# Patient Record
Sex: MALE | Race: WHITE | NOT HISPANIC OR LATINO | Employment: FULL TIME | URBAN - METROPOLITAN AREA
[De-identification: names, ages, dates, MRNs, and addresses within clinical notes are randomized per-mention and may not be internally consistent; named-entity substitution may affect disease eponyms.]

---

## 2018-03-07 ENCOUNTER — APPOINTMENT (OUTPATIENT)
Dept: GENERAL RADIOLOGY | Facility: CLINIC | Age: 43
End: 2018-03-07
Attending: EMERGENCY MEDICINE
Payer: COMMERCIAL

## 2018-03-07 ENCOUNTER — HOSPITAL ENCOUNTER (EMERGENCY)
Facility: CLINIC | Age: 43
Discharge: HOME | End: 2018-03-07
Attending: EMERGENCY MEDICINE | Admitting: EMERGENCY MEDICINE
Payer: COMMERCIAL

## 2018-03-07 VITALS
TEMPERATURE: 98.5 F | SYSTOLIC BLOOD PRESSURE: 107 MMHG | HEIGHT: 71 IN | HEART RATE: 72 BPM | DIASTOLIC BLOOD PRESSURE: 59 MMHG | OXYGEN SATURATION: 98 % | RESPIRATION RATE: 20 BRPM

## 2018-03-07 DIAGNOSIS — S83.92XA SPRAIN OF LEFT KNEE, UNSPECIFIED LIGAMENT, INITIAL ENCOUNTER: ICD-10-CM

## 2018-03-07 PROCEDURE — 29505 APPLICATION LONG LEG SPLINT: CPT | Mod: LT

## 2018-03-07 PROCEDURE — 99284 EMERGENCY DEPT VISIT MOD MDM: CPT | Mod: 25

## 2018-03-07 PROCEDURE — 73560 X-RAY EXAM OF KNEE 1 OR 2: CPT | Mod: LT

## 2018-03-07 PROCEDURE — 25000132 ZZH RX MED GY IP 250 OP 250 PS 637: Performed by: EMERGENCY MEDICINE

## 2018-03-07 RX ORDER — TRAMADOL HYDROCHLORIDE 50 MG/1
50-100 TABLET ORAL EVERY 6 HOURS PRN
Qty: 14 TABLET | Refills: 0 | Status: SHIPPED | OUTPATIENT
Start: 2018-03-07

## 2018-03-07 RX ORDER — OXYCODONE AND ACETAMINOPHEN 5; 325 MG/1; MG/1
2 TABLET ORAL ONCE
Status: COMPLETED | OUTPATIENT
Start: 2018-03-07 | End: 2018-03-07

## 2018-03-07 RX ADMIN — OXYCODONE HYDROCHLORIDE AND ACETAMINOPHEN 2 TABLET: 5; 325 TABLET ORAL at 04:29

## 2018-03-07 ASSESSMENT — ENCOUNTER SYMPTOMS
JOINT SWELLING: 1
NECK PAIN: 0
BACK PAIN: 0
ARTHRALGIAS: 1

## 2018-03-07 NOTE — ED AVS SNAPSHOT
Emergency Department    [address] [facility] [address]    Phone:  [phone]    Fax:  [phone]                                       Jung Lawler   MRN: [id number]    Department:   Emergency Department   Date of Visit:  3/7/2018           Patient Information     Date Of Birth          1975        Your diagnoses for this visit were:     Sprain of left knee, unspecified ligament, initial encounter        You were seen by Neal Araya MD.      Follow-up Information     Follow up with  Emergency Department.    Specialty:  EMERGENCY MEDICINE    Why:  As needed    Contact information:    [address] [facility] [address] [phone]        Follow up with Orthopaedics, [facility] In 1 week.    Why:  As needed    Contact information:    [address] [id number] [phone]          Discharge Instructions       Take the below medications as prescribed.     New Prescriptions    TRAMADOL (ULTRAM) 50 MG TABLET    Take 1-2 tablets ( mg) by mouth every 6 hours as needed for pain Maximum 10 tablet(s) per day       1. -Take acetaminophen 500 to 1000 mg by mouth every 4 to 6 hours as needed for pain or fever.  Do not take more than 4000 mg in 24 hours.  Do not take within 6 hours of another acetaminophen containing medication such as norco (vicodin) or percocet.  - Take ibuprofen 600 to 800 mg by mouth every 6 to 8 hours as needed for pain or fever  2. Use ice as needed for swelling.  3. Elevated extremity to help with swelling.  4. Follow-up with your primary doctor or [facility] as needed.  5. You may return to the ED as needed for new or worsening symptoms such as reinjury, severe and uncontrollable pain, focal weakness, severe numbness and tingling, any other concerning symptoms.      24 Hour Appointment Hotline       To make an appointment at any [facility], call [phone] ([phone]). If you don't have a  family doctor or clinic, we will help you find one. [facility] are conveniently located to serve the needs of you and your family.             Review of your medicines      START taking        Dose / Directions Last dose taken    traMADol 50 MG tablet   Commonly known as:  ULTRAM   Dose:   mg   Quantity:  14 tablet        Take 1-2 tablets ( mg) by mouth every 6 hours as needed for pain Maximum 10 tablet(s) per day   Refills:  0                Prescriptions were sent or printed at these locations (1 Prescription)                   Other Prescriptions                Printed at Department/Unit printer (1 of 1)         traMADol (ULTRAM) 50 MG tablet                Procedures and tests performed during your visit     XR Knee Left 1/2 Views      Orders Needing Specimen Collection     None      Pending Results     Date and Time Order Name Status Description    3/7/2018 0414 XR Knee Left 1/2 Views Preliminary             Pending Culture Results     No orders found from 3/5/2018 to 3/8/2018.            Pending Results Instructions     If you had any lab results that were not finalized at the time of your Discharge, you can call the ED Lab Result RN at [phone]. You will be contacted by this team for any positive Lab results or changes in treatment. The nurses are available 7 days a week from 10A to 6:30P.  You can leave a message 24 hours per day and they will return your call.        Test Results From Your Hospital Stay              3/7/2018  5:24 AM      Narrative     LEFT KNEE 2 VIEWS  3/7/2018 4:57 AM     HISTORY: Fall. Pain.    COMPARISON: None.        Impression     IMPRESSION:  1. No visualized acute fracture or malalignment of the left knee.  2. No left knee joint effusion.                Clinical Quality Measure: Blood Pressure Screening     Your blood pressure was checked while you were in the emergency department today. The last reading we obtained was  BP: 107/59 . Please read the guidelines  "below about what these numbers mean and what you should do about them.  If your systolic blood pressure (the top number) is less than 120 and your diastolic blood pressure (the bottom number) is less than 80, then your blood pressure is normal. There is nothing more that you need to do about it.  If your systolic blood pressure (the top number) is 120-139 or your diastolic blood pressure (the bottom number) is 80-89, your blood pressure may be higher than it should be. You should have your blood pressure rechecked within a year by a primary care provider.  If your systolic blood pressure (the top number) is 140 or greater or your diastolic blood pressure (the bottom number) is 90 or greater, you may have high blood pressure. High blood pressure is treatable, but if left untreated over time it can put you at risk for heart attack, stroke, or kidney failure. You should have your blood pressure rechecked by a primary care provider within the next 4 weeks.  If your provider in the emergency department today gave you specific instructions to follow-up with your doctor or provider even sooner than that, you should follow that instruction and not wait for up to 4 weeks for your follow-up visit.        Thank you for choosing [facility]       Thank you for choosing [facility] for your care. Our goal is always to provide you with excellent care. Hearing back from our patients is one way we can continue to improve our services. Please take a few minutes to complete the written survey that you may receive in the mail after you visit with us. Thank you!        [facility] Information     ibeatyou lets you send messages to your doctor, view your test results, renew your prescriptions, schedule appointments and more. To sign up, go to [email or web address] . Click on \"Log in\" on the left side of the screen, which will take you to the Welcome page. Then click on \"Sign up Now\" on the right side of the page.     You will be asked to enter " the access code listed below, as well as some personal information. Please follow the directions to create your username and password.     Your access code is: [email or web address]  Expires: 2018  5:53 AM     Your access code will  in 90 days. If you need help or a new code, please call your [facility] clinic or [phone].        Care EveryWhere ID     This is your Care EveryWhere ID. This could be used by other organizations to access your [facility] medical records  [id number]        Equal Access to Services     : Marty Arevalo, ricco gale, daria westbrookalvictor hugo riojas, viki henderson . [facility] [phone].    ATENCIÓN: Si habla español, tiene a goins disposición servicios gratuitos de asistencia lingüística. Llame al [phone].    We comply with applicable federal civil rights laws and [place] laws. We do not discriminate on the basis of race, color, national origin, age, disability, sex, sexual orientation, or gender identity.            After Visit Summary       This is your record. Keep this with you and show to your community pharmacist(s) and doctor(s) at your next visit.

## 2018-03-07 NOTE — ED AVS SNAPSHOT
Emergency Department    [address] [place] [address]    Phone:  [phone]    Fax:  [phone]                                       Jung Lawler   MRN: [id number]    Department:   Emergency Department   Date of Visit:  3/7/2018           After Visit Summary Signature Page     I have received my discharge instructions, and my questions have been answered. I have discussed any challenges I see with this plan with the nurse or doctor.    ..........................................................................................................................................  Patient/Patient Representative Signature      ..........................................................................................................................................  Patient Representative Print Name and Relationship to Patient    ..................................................               ................................................  Date                                            Time    ..........................................................................................................................................  Reviewed by Signature/Title    ...................................................              ..............................................  Date                                                            Time

## 2018-03-07 NOTE — DISCHARGE INSTRUCTIONS
Take the below medications as prescribed.     New Prescriptions    TRAMADOL (ULTRAM) 50 MG TABLET    Take 1-2 tablets ( mg) by mouth every 6 hours as needed for pain Maximum 10 tablet(s) per day       1. -Take acetaminophen 500 to 1000 mg by mouth every 4 to 6 hours as needed for pain or fever.  Do not take more than 4000 mg in 24 hours.  Do not take within 6 hours of another acetaminophen containing medication such as norco (vicodin) or percocet.  - Take ibuprofen 600 to 800 mg by mouth every 6 to 8 hours as needed for pain or fever  2. Use ice as needed for swelling.  3. Elevated extremity to help with swelling.  4. Follow-up with your primary doctor or [facility] as needed.  5. You may return to the ED as needed for new or worsening symptoms such as reinjury, severe and uncontrollable pain, focal weakness, severe numbness and tingling, any other concerning symptoms.

## 2018-03-07 NOTE — ED PROVIDER NOTES
"  History     Chief Complaint:  Left Knee Pain    HPI   Jung Lawler is a 42 year old male who presents to the emergency department for evaluation of traumatic left knee pain. The patient states that he was carrying laundry going down the stairs last night at approximately 2200 when he slipped, landing on his left knee. Following this, the patient states that he has had some left knee pain and swelling, which has been progressively worsening, especially with movement. He was initially able to ambulate, though he is not able to ambulate now secondary to pain, prompting his ED visit. He has not taken any medications for his symptoms. He denies any extremity numbness or tingling and denies sustaining any other injuries as a result of his fall, including injury to his head, neck, back, or other extremities.     Allergies:  NKDA     Medications:    The patient is currently on no regular medications.      Past Medical History:    Osgood-Schlatter's disease    Past Surgical History:    Orthopedic right femur procedure  Hemorrhoidectomy     Family / Social History:    No past pertinent family history.     Social History:  Presents alone.   Current Every day smoker, 0.25 ppd.   Positive for alcohol use.   Marital Status:  Single [1]    Review of Systems   Musculoskeletal: Positive for arthralgias (Left Knee), gait problem and joint swelling. Negative for back pain and neck pain.   All other systems reviewed and are negative.    Physical Exam   First Vitals:  BP: 107/59  Pulse: 72  Temp: 98.5  F (36.9  C)  Resp: 20  Height: 180.3 cm (5' 11\")  SpO2: 98 %    Physical Exam   Constitutional: He appears well-developed and well-nourished.   HENT:   Head: Atraumatic.   Eyes: Conjunctivae are normal.   Cardiovascular: Normal rate, regular rhythm and intact distal pulses.    2+ bilat rad and DP pulses   Pulmonary/Chest: Effort normal and breath sounds normal. No stridor.   Musculoskeletal:        Left knee: He exhibits normal range " of motion, no swelling, no effusion, no ecchymosis, no deformity, no laceration, no erythema, no LCL laxity and no MCL laxity. Tenderness found.        Left ankle: Normal.        Left foot: Normal.   No T or L spine tenderness    nml L straight leg test, no patellar tendon defect   Nursing note and vitals reviewed.    Emergency Department Course   Imaging:  Radiographic findings were communicated with the patient who voiced understanding of the findings.    XR Knee, left 1-2 views:   1. No visualized acute fracture or malalignment of the left knee.  2. No left knee joint effusion. As per radiology.     Interventions:  0429 Percocet 5-325 mg 2 tablets PO    Emergency Department Course:  Nursing notes and vitals reviewed. 0408 I performed an exam of the patient as documented above.     IV inserted. Medicine administered as documented above. Blood drawn. This was sent to the lab for further testing, results above.    The patient was sent for a Knee XR while in the emergency department, findings above.     0536 I rechecked the patient and discussed the results of his workup thus far.     The patient was ambulated here in the emergency department with a knee immobilizer without difficulty.     Findings and plan explained to the Patient. Patient discharged home with instructions regarding supportive care, medications, and reasons to return. The importance of close follow-up was reviewed. The patient was prescribed Tramadol.     Impression & Plan    Medical Decision Making:  Jung Lawler is a 42 year old male  who presents for evaluation of his left knee.  His exam findings are noted above, most pertinent is no redness, warmth to knee making septic arthritis and/or crystal disease of joint very unlikely.  Signs and symptoms are consistent with a knee sprain.  A broad differential was also considered including sprain, strain, fracture, tendon rupture, nerve impingement/compromise, referred pain. XR is negative for acute  fracture or dislocation.  Hx and exam also inconsistent w/ dislocation and reduction. Supportive outpatient management is indicated.  Rest, ice, and elevation treatment was discussed with the patient. The patients head to toe trauma exam is otherwise negative for serious underlying disease of the head, neck, chest, abdomen, extremities, pelvis.  Close follow-up with patient's primary care physician or [facility] per discharge precautions.       Diagnosis:    ICD-10-CM   1. Sprain of left knee, unspecified ligament, initial encounter S83.92XA       Disposition:  discharged to home    Discharge Medications:  New Prescriptions    TRAMADOL (ULTRAM) 50 MG TABLET    Take 1-2 tablets ( mg) by mouth every 6 hours as needed for pain Maximum 10 tablet(s) per day     Kayla WANG, am serving as a scribe on 3/7/2018 at 4:08 AM to personally document services performed by Nael Araya MD based on my observations and the provider's statements to me.       Kayla Harrell  3/7/2018    EMERGENCY DEPARTMENT       Nael Araya MD  03/07/18 2007

## 2023-08-24 ENCOUNTER — HOSPITAL ENCOUNTER (EMERGENCY)
Facility: CLINIC | Age: 48
Discharge: HOME | End: 2023-08-24
Attending: PHYSICIAN ASSISTANT | Admitting: PHYSICIAN ASSISTANT
Payer: COMMERCIAL

## 2023-08-24 VITALS
RESPIRATION RATE: 20 BRPM | SYSTOLIC BLOOD PRESSURE: 134 MMHG | BODY MASS INDEX: 19.6 KG/M2 | OXYGEN SATURATION: 99 % | WEIGHT: 140 LBS | HEIGHT: 71 IN | HEART RATE: 79 BPM | DIASTOLIC BLOOD PRESSURE: 80 MMHG | TEMPERATURE: 97.8 F

## 2023-08-24 DIAGNOSIS — K64.5 THROMBOSED EXTERNAL HEMORRHOIDS: ICD-10-CM

## 2023-08-24 PROCEDURE — 99284 EMERGENCY DEPT VISIT MOD MDM: CPT

## 2023-08-24 RX ORDER — OXYCODONE HYDROCHLORIDE 5 MG/1
5 TABLET ORAL EVERY 6 HOURS PRN
Qty: 6 TABLET | Refills: 0 | Status: SHIPPED | OUTPATIENT
Start: 2023-08-24 | End: 2023-08-27

## 2023-08-24 NOTE — ED PROVIDER NOTES
"  History     Chief Complaint:  Rectal/perineal Pain       HPI   Jung Lawler is a 48 year old male with a history of hemorrhoids, status post hemorrhoidectomy, who presents to the ED for evaluation of rectal pain.  Patient states he has a history of hemorrhoids and notes that 2 days ago he developed rectal pain and is able to palpate an external hemorrhoid that he is concerned is thrombosed.  He notes that he has tried suppository preparation H as well as A&D without improvement of symptoms. One episode of small amount of blood coating one of his stools over last 2 days.    Independent Historian:   None - Patient Only    Review of External Notes:   None    Medications:    diltiazem 2% in lipovan cream 2% GEL  oxyCODONE (ROXICODONE) 5 MG tablet  traMADol (ULTRAM) 50 MG tablet        Past Medical History:    Past Medical History:   Diagnosis Date    Osgood-Schlatter's disease        Past Surgical History:    Past Surgical History:   Procedure Laterality Date    ORTHOPEDIC SURGERY      right femur    SOFT TISSUE SURGERY      hemrrhoids        Physical Exam   Patient Vitals for the past 24 hrs:   BP Temp Temp src Pulse Resp SpO2 Height Weight   08/24/23 1330 134/80 97.8  F (36.6  C) Temporal 79 20 99 % 1.803 m (5' 11\") 63.5 kg (140 lb)        Physical Exam  Constitutional: Pleasant. Cooperative.  Eyes: Pupils equally round  HENT: Head is normal in appearance. Oropharynx is normal with moist mucus membranes.  Cardiovascular: Regular rate and rhythm without murmurs.  Respiratory: Normal respiratory effort, lungs clear to auscultation  Musculoskeletal: No asymmetry  Skin: Normal, without rash.  Neurologic: Cranial nerves grossly intact, normal cognition, no apparent deficits.  Psychiatric: Normal affect.  Rectal (Chaperoned)   No gross blood   Single very large external hemorrhoid noted. Likely thrombosed.   No anal fissures noted  Nursing notes and vital signs reviewed.    Emergency Department Course     Emergency " Department Course & Assessments:    Interventions:  Medications - No data to display     Independent Interpretation (X-rays, CTs, rhythm strip):  None    Consultations/Discussion of Management or Tests:  None        Social Determinants of Health affecting care:   None    Disposition:  The patient was discharged to home.     Impression & Plan      Medical Decision Making:  Jung Lawler is a 48 year old male who presents to ED for evaluation of rectal pain.  Patient notes history of hemorrhoids and believes this is the case.  See HPI as above for additional details.  Vitals and physical exam as above.  No evidence for fissure on my exam.  There is a likely thrombosed large hemorrhoid on my exam however.  Discussed conservative cares including sitz bath's, fiber and water, Tylenol and ibuprofen.  Given large nature and severe pain, will provide very short course of oxycodone for home.  We will additionally prescribe diltiazem cream to help with pain.  Referral for colorectal provided.  [place] patient safe for discharge to home. Discussed reasons to return. All questions answered. Patient discharged to home in stable condition.    Diagnosis:    ICD-10-CM    1. Thrombosed external hemorrhoids  K64.5 Adult Colorectal Surgery  Referral           Discharge Medications:  Discharge Medication List as of 8/24/2023  3:58 PM        START taking these medications    Details   diltiazem 2% in lipovan cream 2% GEL Apply one fingertip worth of medication to rectum up to twice daily for rectal pain., Disp-15 g, R-0, E-Prescribe      oxyCODONE (ROXICODONE) 5 MG tablet Take 1 tablet (5 mg) by mouth every 6 hours as needed for pain, Disp-6 tablet, R-0, E-Prescribe            This record was created at least in part using electronic voice recognition software, so please excuse any typographical errors.         Enrique Fisher PA-C  08/24/23 2979

## 2023-08-24 NOTE — ED NOTES
PIT/Triage Evaluation    Patient presented with concern for hemorrhoid pain.  He has a history of hemorrhoids and thrombosed hemorrhoids.  Over the last 2 days, he has had significant pain from a palpable external hemorrhoid.  He has been using rectal suppositories and petroleum-based ointment.  It is about 10 years since he had problems with hemorrhoids.  He did have surgery in the past.  He has had 1 episode of some small amount of blood coating one of his stools over the last 2 days.  He has been alternating between constipation and loose stool.      Exam is notable for:  Alert, oriented, middle-age male in no acute distress, slim  Heart is regular rate  Normal work of breathing  Large external hemorrhoids, tender to palpation, 1 appears erythematous    Appropriate interventions for symptom management were initiated if applicable.  Appropriate diagnostic tests were initiated if indicated.    Important information for subsequent clinician:    I briefly evaluated the patient and developed an initial plan of care. I discussed this plan and explained that this brief interaction does not constitute a full evaluation. Patient/family understands that they should wait to be fully evaluated and discuss any test results with another clinician prior to leaving the hospital.       Curtis Irizarry MD  08/24/23 9136

## 2023-08-24 NOTE — DISCHARGE INSTRUCTIONS
For pain, you may take Tylenol 1000mg every 8 hours and ibuprofen 400mg every 6 hours for pain.  Follow instructions as per discharge paperwork including information on Sitz bath.  Use oxycodone very sparingly for severe rectal pain.  Use diltiazem cream as prescribed for pain.  Follow up with colorectal as per referral for definitive management.

## 2023-08-24 NOTE — ED TRIAGE NOTES
Pt with Hx of Hemorrhoid removal presents with painful hemorrhoid that he first noticed 2 days.      Triage Assessment       Row Name 08/24/23 4450       Triage Assessment (Adult)    Airway WDL WDL       Respiratory WDL    Respiratory WDL WDL       Skin Circulation/Temperature WDL    Skin Circulation/Temperature WDL WDL       Cardiac WDL    Cardiac WDL WDL       Peripheral/Neurovascular WDL    Peripheral Neurovascular WDL WDL       Cognitive/Neuro/Behavioral WDL    Cognitive/Neuro/Behavioral WDL WDL

## 2023-08-25 ENCOUNTER — PATIENT OUTREACH (OUTPATIENT)
Dept: SURGERY | Facility: CLINIC | Age: 48
End: 2023-08-25
Payer: COMMERCIAL

## 2023-08-25 NOTE — PROGRESS NOTES
Thrombosed hemorrhoid triage note:    Jung states on Tuesday he developed a thrombose external hemorrhoid. He has a history of two hemorrhoidectomies. He still reports a lot of rectal pain but it is getting better. Has some minor bleeding. I offer an appt today for possible excision however he can't make it in. He would be outside of the excision window after today. He is aware that at this time conservative management would be recommend. We reviewed warm baths, pain management, and avoiding straining. He agrees with plan.